# Patient Record
Sex: MALE | Race: WHITE | NOT HISPANIC OR LATINO | Employment: FULL TIME | URBAN - METROPOLITAN AREA
[De-identification: names, ages, dates, MRNs, and addresses within clinical notes are randomized per-mention and may not be internally consistent; named-entity substitution may affect disease eponyms.]

---

## 2017-01-16 ENCOUNTER — OFFICE VISIT (OUTPATIENT)
Dept: RETAIL CLINIC | Facility: CLINIC | Age: 52
End: 2017-01-16

## 2017-01-16 VITALS — TEMPERATURE: 98.1 F | OXYGEN SATURATION: 97 % | HEART RATE: 87 BPM

## 2017-01-16 DIAGNOSIS — J06.9 ACUTE URI: Primary | ICD-10-CM

## 2017-01-16 DIAGNOSIS — J01.00 ACUTE NON-RECURRENT MAXILLARY SINUSITIS: ICD-10-CM

## 2017-01-16 PROCEDURE — 99213 OFFICE O/P EST LOW 20 MIN: CPT | Performed by: NURSE PRACTITIONER

## 2017-01-16 RX ORDER — PREDNISONE 20 MG/1
20 TABLET ORAL 2 TIMES DAILY
Qty: 8 TABLET | Refills: 0 | Status: SHIPPED | OUTPATIENT
Start: 2017-01-16 | End: 2017-01-20

## 2017-01-16 RX ORDER — ALBUTEROL SULFATE 90 UG/1
2 AEROSOL, METERED RESPIRATORY (INHALATION) EVERY 4 HOURS PRN
Qty: 1 INHALER | Refills: 0 | Status: SHIPPED | OUTPATIENT
Start: 2017-01-16

## 2017-01-16 RX ORDER — BROMPHENIRAMINE MALEATE, PSEUDOEPHEDRINE HYDROCHLORIDE, AND DEXTROMETHORPHAN HYDROBROMIDE 2; 30; 10 MG/5ML; MG/5ML; MG/5ML
5 SYRUP ORAL 4 TIMES DAILY PRN
Qty: 118 ML | Refills: 0 | Status: SHIPPED | OUTPATIENT
Start: 2017-01-16 | End: 2017-01-23

## 2017-01-16 RX ORDER — DEXTROMETHORPHAN HYDROBROMIDE AND PROMETHAZINE HYDROCHLORIDE 15; 6.25 MG/5ML; MG/5ML
5 SYRUP ORAL NIGHTLY PRN
Qty: 35 ML | Refills: 0 | Status: SHIPPED | OUTPATIENT
Start: 2017-01-16 | End: 2017-01-23

## 2017-01-16 RX ORDER — AMITRIPTYLINE HYDROCHLORIDE 50 MG/1
50 TABLET, FILM COATED ORAL NIGHTLY
COMMUNITY

## 2017-01-16 NOTE — PATIENT INSTRUCTIONS
"Upper Respiratory Infection, Adult  Most upper respiratory infections (URIs) are a viral infection of the air passages leading to the lungs. A URI affects the nose, throat, and upper air passages. The most common type of URI is nasopharyngitis and is typically referred to as \"the common cold.\"  URIs run their course and usually go away on their own. Most of the time, a URI does not require medical attention, but sometimes a bacterial infection in the upper airways can follow a viral infection. This is called a secondary infection. Sinus and middle ear infections are common types of secondary upper respiratory infections.  Bacterial pneumonia can also complicate a URI. A URI can worsen asthma and chronic obstructive pulmonary disease (COPD). Sometimes, these complications can require emergency medical care and may be life threatening.   CAUSES  Almost all URIs are caused by viruses. A virus is a type of germ and can spread from one person to another.   RISKS FACTORS  You may be at risk for a URI if:   · You smoke.    · You have chronic heart or lung disease.  · You have a weakened defense (immune) system.    · You are very young or very old.    · You have nasal allergies or asthma.  · You work in crowded or poorly ventilated areas.  · You work in health care facilities or schools.  SIGNS AND SYMPTOMS   Symptoms typically develop 2-3 days after you come in contact with a cold virus. Most viral URIs last 7-10 days. However, viral URIs from the influenza virus (flu virus) can last 14-18 days and are typically more severe. Symptoms may include:   · Runny or stuffy (congested) nose.    · Sneezing.    · Cough.    · Sore throat.    · Headache.    · Fatigue.    · Fever.    · Loss of appetite.    · Pain in your forehead, behind your eyes, and over your cheekbones (sinus pain).  · Muscle aches.    DIAGNOSIS   Your health care provider may diagnose a URI by:  · Physical exam.  · Tests to check that your symptoms are not due to " another condition such as:  ¨ Strep throat.  ¨ Sinusitis.  ¨ Pneumonia.  ¨ Asthma.  TREATMENT   A URI goes away on its own with time. It cannot be cured with medicines, but medicines may be prescribed or recommended to relieve symptoms. Medicines may help:  · Reduce your fever.  · Reduce your cough.  · Relieve nasal congestion.  HOME CARE INSTRUCTIONS   · Take medicines only as directed by your health care provider.    · Gargle warm saltwater or take cough drops to comfort your throat as directed by your health care provider.  · Use a warm mist humidifier or inhale steam from a shower to increase air moisture. This may make it easier to breathe.  · Drink enough fluid to keep your urine clear or pale yellow.    · Eat soups and other clear broths and maintain good nutrition.    · Rest as needed.    · Return to work when your temperature has returned to normal or as your health care provider advises. You may need to stay home longer to avoid infecting others. You can also use a face mask and careful hand washing to prevent spread of the virus.  · Increase the usage of your inhaler if you have asthma.    · Do not use any tobacco products, including cigarettes, chewing tobacco, or electronic cigarettes. If you need help quitting, ask your health care provider.  PREVENTION   The best way to protect yourself from getting a cold is to practice good hygiene.   · Avoid oral or hand contact with people with cold symptoms.    · Wash your hands often if contact occurs.    There is no clear evidence that vitamin C, vitamin E, echinacea, or exercise reduces the chance of developing a cold. However, it is always recommended to get plenty of rest, exercise, and practice good nutrition.   SEEK MEDICAL CARE IF:   · You are getting worse rather than better.    · Your symptoms are not controlled by medicine.    · You have chills.  · You have worsening shortness of breath.  · You have brown or red mucus.  · You have yellow or brown nasal  discharge.  · You have pain in your face, especially when you bend forward.  · You have a fever.  · You have swollen neck glands.  · You have pain while swallowing.  · You have white areas in the back of your throat.  SEEK IMMEDIATE MEDICAL CARE IF:   · You have severe or persistent:    Headache.    Ear pain.    Sinus pain.    Chest pain.  · You have chronic lung disease and any of the following:    Wheezing.    Prolonged cough.    Coughing up blood.    A change in your usual mucus.  · You have a stiff neck.  · You have changes in your:    Vision.    Hearing.    Thinking.    Mood.  MAKE SURE YOU:   · Understand these instructions.  · Will watch your condition.  · Will get help right away if you are not doing well or get worse.     This information is not intended to replace advice given to you by your health care provider. Make sure you discuss any questions you have with your health care provider.     Document Released: 06/13/2002 Document Revised: 05/03/2016 Document Reviewed: 03/25/2015  [employer] Interactive Patient Education ©2016 [employer]

## 2017-01-16 NOTE — MR AVS SNAPSHOT
Randolph Krishnamurthy   1/16/2017 9:30 AM   Office Visit    Dept Phone:  [phone]   Encounter #:  [id number]    Provider:  PARESH AYALA   Department:  [facility]                Your Full Care Plan              Today's Medication Changes          These changes are accurate as of: 1/16/17 10:00 AM.  If you have any questions, ask your nurse or doctor.               New Medication(s)Ordered:     albuterol 108 (90 BASE) MCG/ACT inhaler   Commonly known as:  PROVENTIL HFA;VENTOLIN HFA   Inhale 2 puffs Every 4 (Four) Hours As Needed for wheezing or shortness of air.       brompheniramine-pseudoephedrine-DM 30-2-10 MG/5ML syrup   Take 5 mL by mouth 4 (Four) Times a Day As Needed for congestion, cough or allergies for up to 7 days.       predniSONE 20 MG tablet   Commonly known as:  DELTASONE   Take 1 tablet by mouth 2 (Two) Times a Day for 4 days.       promethazine-dextromethorphan 6.25-15 MG/5ML syrup   Commonly known as:  PROMETHAZINE-DM   Take 5 mL by mouth At Night As Needed for cough for up to 7 days.            Where to Get Your Medications      These medications were sent to [facility] [address] [place] [address] - [address] [place] [address] - [phone] [place] [id number] [place] [address]     Phone:  [phone]     albuterol 108 (90 BASE) MCG/ACT inhaler    brompheniramine-pseudoephedrine-DM 30-2-10 MG/5ML syrup    predniSONE 20 MG tablet    promethazine-dextromethorphan 6.25-15 MG/5ML syrup                  Your Updated Medication List          This list is accurate as of: 1/16/17 10:00 AM.  Always use your most recent med list.                albuterol 108 (90 BASE) MCG/ACT inhaler   Commonly known as:  PROVENTIL HFA;VENTOLIN HFA   Inhale 2 puffs Every 4 (Four) Hours As Needed for wheezing or shortness of air.       amitriptyline 50 MG tablet   Commonly known as:  ELAVIL       brompheniramine-pseudoephedrine-DM 30-2-10 MG/5ML syrup    "  Take 5 mL by mouth 4 (Four) Times a Day As Needed for congestion, cough or allergies for up to 7 days.       predniSONE 20 MG tablet   Commonly known as:  DELTASONE   Take 1 tablet by mouth 2 (Two) Times a Day for 4 days.       promethazine-dextromethorphan 6.25-15 MG/5ML syrup   Commonly known as:  PROMETHAZINE-DM   Take 5 mL by mouth At Night As Needed for cough for up to 7 days.               You Were Diagnosed With        Codes Comments    Acute URI    -  Primary ICD-10-CM: J06.9  ICD-9-CM: 465.9       Instructions    Upper Respiratory Infection, Adult  Most upper respiratory infections (URIs) are a viral infection of the air passages leading to the lungs. A URI affects the nose, throat, and upper air passages. The most common type of URI is nasopharyngitis and is typically referred to as \"the common cold.\"  URIs run their course and usually go away on their own. Most of the time, a URI does not require medical attention, but sometimes a bacterial infection in the upper airways can follow a viral infection. This is called a secondary infection. Sinus and middle ear infections are common types of secondary upper respiratory infections.  Bacterial pneumonia can also complicate a URI. A URI can worsen asthma and chronic obstructive pulmonary disease (COPD). Sometimes, these complications can require emergency medical care and may be life threatening.   CAUSES  Almost all URIs are caused by viruses. A virus is a type of germ and can spread from one person to another.   RISKS FACTORS  You may be at risk for a URI if:   · You smoke.    · You have chronic heart or lung disease.  · You have a weakened defense (immune) system.    · You are very young or very old.    · You have nasal allergies or asthma.  · You work in crowded or poorly ventilated areas.  · You work in health care facilities or schools.  SIGNS AND SYMPTOMS   Symptoms typically develop 2-3 days after you come in contact with a cold virus. Most viral URIs " last 7-10 days. However, viral URIs from the influenza virus (flu virus) can last 14-18 days and are typically more severe. Symptoms may include:   · Runny or stuffy (congested) nose.    · Sneezing.    · Cough.    · Sore throat.    · Headache.    · Fatigue.    · Fever.    · Loss of appetite.    · Pain in your forehead, behind your eyes, and over your cheekbones (sinus pain).  · Muscle aches.    DIAGNOSIS   Your health care provider may diagnose a URI by:  · Physical exam.  · Tests to check that your symptoms are not due to another condition such as:  ¨ Strep throat.  ¨ Sinusitis.  ¨ Pneumonia.  ¨ Asthma.  TREATMENT   A URI goes away on its own with time. It cannot be cured with medicines, but medicines may be prescribed or recommended to relieve symptoms. Medicines may help:  · Reduce your fever.  · Reduce your cough.  · Relieve nasal congestion.  HOME CARE INSTRUCTIONS   · Take medicines only as directed by your health care provider.    · Gargle warm saltwater or take cough drops to comfort your throat as directed by your health care provider.  · Use a warm mist humidifier or inhale steam from a shower to increase air moisture. This may make it easier to breathe.  · Drink enough fluid to keep your urine clear or pale yellow.    · Eat soups and other clear broths and maintain good nutrition.    · Rest as needed.    · Return to work when your temperature has returned to normal or as your health care provider advises. You may need to stay home longer to avoid infecting others. You can also use a face mask and careful hand washing to prevent spread of the virus.  · Increase the usage of your inhaler if you have asthma.    · Do not use any tobacco products, including cigarettes, chewing tobacco, or electronic cigarettes. If you need help quitting, ask your health care provider.  PREVENTION   The best way to protect yourself from getting a cold is to practice good hygiene.   · Avoid oral or hand contact with people with  cold symptoms.    · Wash your hands often if contact occurs.    There is no clear evidence that vitamin C, vitamin E, echinacea, or exercise reduces the chance of developing a cold. However, it is always recommended to get plenty of rest, exercise, and practice good nutrition.   SEEK MEDICAL CARE IF:   · You are getting worse rather than better.    · Your symptoms are not controlled by medicine.    · You have chills.  · You have worsening shortness of breath.  · You have brown or red mucus.  · You have yellow or brown nasal discharge.  · You have pain in your face, especially when you bend forward.  · You have a fever.  · You have swollen neck glands.  · You have pain while swallowing.  · You have white areas in the back of your throat.  SEEK IMMEDIATE MEDICAL CARE IF:   · You have severe or persistent:    Headache.    Ear pain.    Sinus pain.    Chest pain.  · You have chronic lung disease and any of the following:    Wheezing.    Prolonged cough.    Coughing up blood.    A change in your usual mucus.  · You have a stiff neck.  · You have changes in your:    Vision.    Hearing.    Thinking.    Mood.  MAKE SURE YOU:   · Understand these instructions.  · Will watch your condition.  · Will get help right away if you are not doing well or get worse.     This information is not intended to replace advice given to you by your health care provider. Make sure you discuss any questions you have with your health care provider.     Document Released: 06/13/2002 Document Revised: 05/03/2016 Document Reviewed: 03/25/2015  [employer] Interactive Patient Education ©2016 [employer]       Patient Instructions History      Upcoming Appointments     Visit Type Date Time Department    NEW PATIENT 1/16/2017  9:30 AM [facility] NIECY AYALA      [facility] Signup     [facility] allows you to send messages to your doctor, view your test results, renew your prescriptions, schedule appointments, and more. To sign up, go to  [email or web address] and click on the Sign Up Now link in the New User? box. Enter your [employer] Activation Code exactly as it appears below along with the last four digits of your Social Security Number and your Date of Birth () to complete the sign-up process. If you do not sign up before the expiration date, you must request a new code.    mmCHANNEL Activation Code: [email or web address]  Expires: 2017 10:00 AM    If you have questions, you can email [employer] [email or web address] or call [phone] to talk to our mm[employer] staff. Remember, mm[employer] is NOT to be used for urgent needs. For medical emergencies, dial 911.               Other Info from Your Visit           Allergies     No Known Allergies      Vital Signs     Smoking Status                   Never Smoker           Problems and Diagnoses Noted     Acute upper respiratory infection    -  Primary

## 2017-01-16 NOTE — PROGRESS NOTES
Wendy Krishnamurthy is a 51 y.o. male who presents to the clinic with the following:       Cough   This is a new problem. The current episode started yesterday. The problem has been gradually worsening. The cough is non-productive. Associated symptoms include ear congestion, a fever (101.0), nasal congestion, postnasal drip, rhinorrhea, a sore throat and shortness of breath. Pertinent negatives include no ear pain, headaches or wheezing. The symptoms are aggravated by lying down. He has tried OTC cough suppressant for the symptoms. The treatment provided no relief. His past medical history is significant for environmental allergies.   Fever    This is a new problem. The current episode started yesterday. The problem occurs intermittently. The problem has been unchanged. The maximum temperature noted was 100 to 100.9 F. The temperature was taken using an oral thermometer. Associated symptoms include congestion, coughing and a sore throat. Pertinent negatives include no diarrhea, ear pain, headaches, nausea, vomiting or wheezing. He has tried NSAIDs for the symptoms. The treatment provided moderate relief.       The following portions of the patient's history were reviewed and updated as appropriate: allergies, current medications, past family history, past medical history, past social history, past surgical history and problem list.    Review of Systems   Constitutional: Positive for fever (101.0).   HENT: Positive for congestion, postnasal drip, rhinorrhea, sinus pressure, sneezing and sore throat. Negative for ear discharge, ear pain and trouble swallowing.    Respiratory: Positive for cough and shortness of breath. Negative for wheezing.    Gastrointestinal: Negative for diarrhea, nausea and vomiting.   Allergic/Immunologic: Positive for environmental allergies.   Neurological: Negative for headaches.   Psychiatric/Behavioral: Negative.        Objective   Physical Exam   Constitutional: He is oriented  to person, place, and time. He appears well-developed and well-nourished.   HENT:   Head: Normocephalic and atraumatic.   Right Ear: Tympanic membrane normal.   Left Ear: Tympanic membrane normal.   Nose: Rhinorrhea present. Right sinus exhibits maxillary sinus tenderness. Left sinus exhibits maxillary sinus tenderness.   Mouth/Throat: Uvula is midline and mucous membranes are normal. Posterior oropharyngeal erythema (moderate cobblestoning) present. No tonsillar exudate.   Eyes: Pupils are equal, round, and reactive to light.   Neck: Normal range of motion.   Cardiovascular: Normal rate, regular rhythm and normal heart sounds.    Pulmonary/Chest: Effort normal. He has decreased breath sounds in the right upper field and the left upper field.   Lymphadenopathy:     He has no cervical adenopathy.   Neurological: He is alert and oriented to person, place, and time.   Skin: Skin is warm and dry.     Vitals:    01/16/17 1002   Pulse: 87   Temp: 98.1 °F (36.7 °C)   SpO2: 97%         Assessment/Plan   Randolph was seen today for cough and nasal congestion.    Diagnoses and all orders for this visit:    Acute URI  -     brompheniramine-pseudoephedrine-DM 30-2-10 MG/5ML syrup; Take 5 mL by mouth 4 (Four) Times a Day As Needed for congestion, cough or allergies for up to 7 days.  -     promethazine-dextromethorphan (PROMETHAZINE-DM) 6.25-15 MG/5ML syrup; Take 5 mL by mouth At Night As Needed for cough for up to 7 days.  -     albuterol (PROVENTIL HFA;VENTOLIN HFA) 108 (90 BASE) MCG/ACT inhaler; Inhale 2 puffs Every 4 (Four) Hours As Needed for wheezing or shortness of air.    Acute non-recurrent maxillary sinusitis  -     predniSONE (DELTASONE) 20 MG tablet; Take 1 tablet by mouth 2 (Two) Times a Day for 4 days.     Reviewed home care instructions, and patient verbalized understanding. Patient instructed to follow up with PCP and/or ED for worsening or non-resolving symptoms.     Patient instructed to call clinic on  Friday if no improvement is seen with the above prescribed plan of care.    Julia Pulido, APRN

## 2017-12-13 ENCOUNTER — TRANSCRIBE ORDERS (OUTPATIENT)
Dept: PHYSICAL THERAPY | Facility: HOSPITAL | Age: 52
End: 2017-12-13

## 2017-12-13 DIAGNOSIS — M77.12 LATERAL EPICONDYLITIS OF LEFT ELBOW: Primary | ICD-10-CM

## 2018-08-16 ENCOUNTER — APPOINTMENT (OUTPATIENT)
Dept: MRI IMAGING | Facility: HOSPITAL | Age: 53
End: 2018-08-16

## 2018-08-16 ENCOUNTER — HOSPITAL ENCOUNTER (EMERGENCY)
Facility: HOSPITAL | Age: 53
Discharge: HOME | End: 2018-08-16
Attending: EMERGENCY MEDICINE | Admitting: EMERGENCY MEDICINE

## 2018-08-16 VITALS
BODY MASS INDEX: 30.78 KG/M2 | HEART RATE: 68 BPM | SYSTOLIC BLOOD PRESSURE: 109 MMHG | WEIGHT: 215 LBS | TEMPERATURE: 98 F | HEIGHT: 70 IN | OXYGEN SATURATION: 92 % | DIASTOLIC BLOOD PRESSURE: 74 MMHG | RESPIRATION RATE: 18 BRPM

## 2018-08-16 DIAGNOSIS — M51.36 BULGING LUMBAR DISC: ICD-10-CM

## 2018-08-16 DIAGNOSIS — M54.10 RADICULAR PAIN OF LEFT LOWER EXTREMITY: ICD-10-CM

## 2018-08-16 DIAGNOSIS — M54.42 ACUTE LEFT-SIDED LOW BACK PAIN WITH LEFT-SIDED SCIATICA: Primary | ICD-10-CM

## 2018-08-16 PROCEDURE — 25010000002 METHYLPREDNISOLONE PER 125 MG: Performed by: EMERGENCY MEDICINE

## 2018-08-16 PROCEDURE — 25010000002 HYDROMORPHONE PER 4 MG: Performed by: EMERGENCY MEDICINE

## 2018-08-16 PROCEDURE — 72148 MRI LUMBAR SPINE W/O DYE: CPT

## 2018-08-16 PROCEDURE — 99284 EMERGENCY DEPT VISIT MOD MDM: CPT

## 2018-08-16 PROCEDURE — 25010000002 ONDANSETRON PER 1 MG: Performed by: EMERGENCY MEDICINE

## 2018-08-16 PROCEDURE — 96361 HYDRATE IV INFUSION ADD-ON: CPT

## 2018-08-16 PROCEDURE — 96375 TX/PRO/DX INJ NEW DRUG ADDON: CPT

## 2018-08-16 PROCEDURE — 96374 THER/PROPH/DIAG INJ IV PUSH: CPT

## 2018-08-16 PROCEDURE — 96376 TX/PRO/DX INJ SAME DRUG ADON: CPT

## 2018-08-16 RX ORDER — HYDROMORPHONE HYDROCHLORIDE 1 MG/ML
0.5 INJECTION, SOLUTION INTRAMUSCULAR; INTRAVENOUS; SUBCUTANEOUS ONCE
Status: COMPLETED | OUTPATIENT
Start: 2018-08-16 | End: 2018-08-16

## 2018-08-16 RX ORDER — IBUPROFEN 600 MG/1
600 TABLET ORAL EVERY 6 HOURS PRN
Qty: 15 TABLET | Refills: 0 | Status: SHIPPED | OUTPATIENT
Start: 2018-08-16

## 2018-08-16 RX ORDER — ONDANSETRON 4 MG/1
4 TABLET, ORALLY DISINTEGRATING ORAL EVERY 4 HOURS
Qty: 12 TABLET | Refills: 0 | Status: SHIPPED | OUTPATIENT
Start: 2018-08-16

## 2018-08-16 RX ORDER — METHYLPREDNISOLONE SODIUM SUCCINATE 125 MG/2ML
125 INJECTION, POWDER, LYOPHILIZED, FOR SOLUTION INTRAMUSCULAR; INTRAVENOUS ONCE
Status: COMPLETED | OUTPATIENT
Start: 2018-08-16 | End: 2018-08-16

## 2018-08-16 RX ORDER — PREDNISONE 20 MG/1
20 TABLET ORAL 3 TIMES DAILY
Qty: 15 TABLET | Refills: 0 | Status: SHIPPED | OUTPATIENT
Start: 2018-08-16

## 2018-08-16 RX ORDER — ONDANSETRON 2 MG/ML
4 INJECTION INTRAMUSCULAR; INTRAVENOUS ONCE
Status: COMPLETED | OUTPATIENT
Start: 2018-08-16 | End: 2018-08-16

## 2018-08-16 RX ORDER — OXYCODONE AND ACETAMINOPHEN 7.5; 325 MG/1; MG/1
1 TABLET ORAL EVERY 6 HOURS PRN
Qty: 12 TABLET | Refills: 0 | Status: SHIPPED | OUTPATIENT
Start: 2018-08-16

## 2018-08-16 RX ADMIN — HYDROMORPHONE HYDROCHLORIDE 0.5 MG: 1 INJECTION, SOLUTION INTRAMUSCULAR; INTRAVENOUS; SUBCUTANEOUS at 01:41

## 2018-08-16 RX ADMIN — ONDANSETRON 4 MG: 2 INJECTION, SOLUTION INTRAMUSCULAR; INTRAVENOUS at 01:35

## 2018-08-16 RX ADMIN — SODIUM CHLORIDE 500 ML: 9 INJECTION, SOLUTION INTRAVENOUS at 04:54

## 2018-08-16 RX ADMIN — HYDROMORPHONE HYDROCHLORIDE 0.5 MG: 1 INJECTION, SOLUTION INTRAMUSCULAR; INTRAVENOUS; SUBCUTANEOUS at 04:53

## 2018-08-16 RX ADMIN — METHYLPREDNISOLONE SODIUM SUCCINATE 125 MG: 125 INJECTION, POWDER, FOR SOLUTION INTRAMUSCULAR; INTRAVENOUS at 01:38

## 2018-08-16 NOTE — ED PROVIDER NOTES
"Subjective   This 52-year-old  male that presents to the ER with severe low back pain that started approximately 3 PM this afternoon.  Patient states that he was bending over and trying to manipulate a large drawer full of pots in the kitchen to get it back on track in the cabinet.  He felt a sudden intense pain in his low back while he was manipulating the drawer and felt like he \"tweaked it\".  He thought he had strained some muscles in his low back and tried to ambulate to see if it would help improve symptoms of pain.  As the evening went on, symptoms progressively worsened.  He describes significant, sharp, stabbing pains to his mid low back radiation to his left lateral lower extremity.  He reports tingling sensation to his leg as well, but denies any numbness.  He denies any urinary or bowel changes or incontinence.  He has been unable to get any relief for the last 9 hours.  He says that the only relief he can get this from lying completely supine.  He is unable to sit, stand, or ambulate.  He did try some over-the-counter Tylenol and ibuprofen without any relief.  He has no history of chronic back pain or previous injury.  He said that he has strained his back at times in the past.  His wife became so concerned due to severe pain and inability to ambulate that she called the ambulance and he was transported to the ER for further evaluation.  He has no significant past medical history except for history of melanoma on his back.        History provided by:  Patient and spouse  Back Pain   Location:  Lumbar spine  Quality:  Shooting and stabbing  Radiates to: Left lateral lower extremity to the knee.  Pain severity:  Severe  Worse during: Progressing since injury at 1500 today.  Onset quality:  Gradual  Duration:  9 hours  Timing:  Constant  Progression:  Worsening  Chronicity:  New  Context: lifting heavy objects and recent injury    Context comment:  Patient was bending over trying to put a large " "drawer full of pots back on track for his wife.  He felt significant pain to his low back during this and thought he just \"tweaked it\".  Relieved by:  Lying down (lying completely supine helps minimally.)  Worsened by:  Sitting, standing, movement, bending and ambulation  Ineffective treatments:  OTC medications  Associated symptoms: leg pain (Sharp, shooting pain to left lateral aspect of leg to the knee.) and tingling    Associated symptoms: no abdominal pain, no bladder incontinence, no bowel incontinence, no dysuria, no numbness, no paresthesias, no perianal numbness and no weakness    Risk factors comment:  No history of chronic low back pain or previous surgeries to back.      Review of Systems   Constitutional: Negative for appetite change.   Gastrointestinal: Negative for abdominal pain, bowel incontinence, nausea and vomiting.   Genitourinary: Negative for bladder incontinence and dysuria.   Musculoskeletal: Positive for back pain and gait problem (difficulty ambulating secondary to low back pain.).   Neurological: Positive for tingling. Negative for weakness, numbness and paresthesias.        Patient says the low back pain was so intense at times, he had near syncopal episodes x 2.       Past Medical History:   Diagnosis Date   • Cancer (CMS/HCC)     melanona removed from back   • Sciatica        No Known Allergies    History reviewed. No pertinent surgical history.    History reviewed. No pertinent family history.    Social History     Social History   • Marital status:      Social History Main Topics   • Smoking status: Never Smoker   • Smokeless tobacco: Never Used   • Alcohol use Yes      Comment: social   • Drug use: No   • Sexual activity: Defer     Other Topics Concern   • Not on file           Objective   Physical Exam   Constitutional: He is oriented to person, place, and time. He appears well-developed and well-nourished. No distress.   Patient lying supine secondary to significant low " back pain.   HENT:   Head: Normocephalic and atraumatic.   Mouth/Throat: Oropharynx is clear and moist.   Eyes: Pupils are equal, round, and reactive to light. Conjunctivae and EOM are normal. No scleral icterus.   Neck: Normal range of motion. Neck supple.   Cardiovascular: Normal rate, regular rhythm and normal heart sounds.    Pulses:       Dorsalis pedis pulses are 2+ on the right side, and 2+ on the left side.        Posterior tibial pulses are 2+ on the right side, and 2+ on the left side.   Pulmonary/Chest: Effort normal and breath sounds normal. No respiratory distress.   Abdominal: Soft. Bowel sounds are normal. He exhibits no distension. There is no tenderness.   Musculoskeletal: He exhibits no edema.        Lumbar back: He exhibits decreased range of motion, tenderness, bony tenderness and pain. He exhibits no swelling, no spasm and normal pulse.        Back:    Patient could not sit upright for complete exam of reflexes and straight leg testing.  I tried to test reflexes in the bed and he had 2+ patellar reflexes bilaterally.   Lymphadenopathy:     He has no cervical adenopathy.   Neurological: He is alert and oriented to person, place, and time. He has normal strength. No cranial nerve deficit or sensory deficit.   Skin: Skin is warm and dry. He is not diaphoretic.   Psychiatric: He has a normal mood and affect. His behavior is normal.   Nursing note and vitals reviewed.      Procedures           ED Course  ED Course as of Aug 16 0513   Thu Aug 16, 2018   0447 Re-evaluated patient.  He says the pain was relieved somewhat until he had a MRI of his lumbar spine.  He now reports pain is returning and requests something to help with that.  Discussed the case thoroughly with Dr. Wagoner.  He will follow on the MRI results.  [FC]   0505 MRI of the lumbar spine without contrast reveals asymmetric disc bulge at L4-L5, left greater than right with sub-annular fissuring centrally.  Mild flattening of the thecal  sac.  Moderate inferior left neural foraminal encroachment.  There is also left postero-lateral focal bulge at L2-L3.  Patient has no urinary or bowel incontinence and no concerns for cauda equina syndrome.  We will order Percocet as needed for pain on discharge and recommend close follow-up with neurosurgery, Dr. Fairbanks.  Return if worsening symptoms  [FC]      ED Course User Index  [FC] Nurys Gonzalez, [place]        No results found for this or any previous visit (from the past 24 hour(s)).  Note: In addition to lab results from this visit, the labs listed above may include labs taken at another facility or during a different encounter within the last 24 hours. Please correlate lab times with ED admission and discharge times for further clarification of the services performed during this visit.    MRI Lumbar Spine Without Contrast   Final Result   Asymmetric disc bulge left greater than right with subannular    fissuring at L4-5.      L2-3 left posterolateral focal bulge.      THIS DOCUMENT HAS BEEN ELECTRONICALLY SIGNED BY BEATA SALDIVAR MD        Vitals:    08/16/18 0401 08/16/18 0408 08/16/18 0426 08/16/18 0430   BP: 118/74   109/74   BP Location:       Patient Position:       Pulse:       Resp:       Temp:       TempSrc:       SpO2:  93% 92%    Weight:       Height:         Medications   sodium chloride 0.9 % bolus 500 mL (500 mL Intravenous New Bag 8/16/18 0454)   HYDROmorphone (DILAUDID) injection 0.5 mg (0.5 mg Intravenous Given 8/16/18 0141)   ondansetron (ZOFRAN) injection 4 mg (4 mg Intravenous Given 8/16/18 0135)   methylPREDNISolone sodium succinate (SOLU-Medrol) injection 125 mg (125 mg Intravenous Given 8/16/18 0138)   HYDROmorphone (DILAUDID) injection 0.5 mg (0.5 mg Intravenous Given 8/16/18 0453)     ECG/EMG Results (last 24 hours)     ** No results found for the last 24 hours. **        CARMEN query complete. Treatment plan to include limited course of prescribed  controlled substance. Risks  including addiction, benefits, and alternatives presented to patient.           MDM      Final diagnoses:   Acute left-sided low back pain with left-sided sciatica   Radicular pain of left lower extremity   Bulging lumbar disc            Nurys Gonzalez, [place]  08/16/18 0578

## 2018-08-16 NOTE — DISCHARGE INSTRUCTIONS
MRI of the lumbar spine shows bulging disks at L2-L3 and L4-L5, left greater than right.  Recommend patient to call Dr. Fairbanks, neurosurgery, for first available follow-up.  Rx for Percocet and Zofran as needed for pain and nausea.  Return if worsening symptoms of urinary or bowel incontinence or intractable pain.